# Patient Record
Sex: MALE | Race: WHITE | NOT HISPANIC OR LATINO
[De-identification: names, ages, dates, MRNs, and addresses within clinical notes are randomized per-mention and may not be internally consistent; named-entity substitution may affect disease eponyms.]

---

## 2019-01-23 ENCOUNTER — TRANSCRIPTION ENCOUNTER (OUTPATIENT)
Age: 49
End: 2019-01-23

## 2021-10-13 ENCOUNTER — RESULT REVIEW (OUTPATIENT)
Age: 51
End: 2021-10-13

## 2022-09-06 PROBLEM — Z00.00 ENCOUNTER FOR PREVENTIVE HEALTH EXAMINATION: Status: ACTIVE | Noted: 2022-09-06

## 2022-09-22 ENCOUNTER — APPOINTMENT (OUTPATIENT)
Dept: ORTHOPEDIC SURGERY | Facility: CLINIC | Age: 52
End: 2022-09-22

## 2022-09-22 VITALS — BODY MASS INDEX: 23.43 KG/M2 | HEIGHT: 72 IN | WEIGHT: 173 LBS

## 2022-09-22 DIAGNOSIS — M25.532 PAIN IN LEFT WRIST: ICD-10-CM

## 2022-09-22 DIAGNOSIS — Z78.9 OTHER SPECIFIED HEALTH STATUS: ICD-10-CM

## 2022-09-22 PROCEDURE — 20605 DRAIN/INJ JOINT/BURSA W/O US: CPT

## 2022-09-22 PROCEDURE — 99203 OFFICE O/P NEW LOW 30 MIN: CPT | Mod: 25

## 2022-09-22 PROCEDURE — 99213 OFFICE O/P EST LOW 20 MIN: CPT | Mod: 25

## 2022-09-22 PROCEDURE — 73110 X-RAY EXAM OF WRIST: CPT | Mod: LT

## 2022-09-22 NOTE — PROCEDURE
[Medium Joint Injection] : medium joint injection [Left] : of the left [Wrist] : wrist [Pain] : pain [Inflammation] : inflammation [Alcohol] : alcohol [Ethyl Chloride sprayed topically] : ethyl chloride sprayed topically [Sterile technique used] : sterile technique used [___ cc    1%] : Lidocaine ~Vcc of 1%  [___ cc    10mg] : Triamcinolone (Kenalog) ~Vcc of 10 mg

## 2022-09-22 NOTE — PHYSICAL EXAM
[Dorsal Wrist] : dorsal wrist [UJ] : UJ [Left] : left wrist [] : negative Alvarez's [FreeTextEntry9] : flexion 70/extension 75, pain with forced extension  [de-identified] : mild positive  [FreeTextEntry8] : signs of old scaphoid fx with styloid degenerative changes

## 2022-09-22 NOTE — HISTORY OF PRESENT ILLNESS
[Gradual] : gradual [7] : 7 [0] : 0 [Localized] : localized [Sharp] : sharp [Constant] : constant [Nothing helps with pain getting better] : Nothing helps with pain getting better [Full time] : Work status: full time [de-identified] : 52 year old male with LEFT wrist pain for 4-6 weeks. No injury/trauma. Pain with wrist flexion.  [] : no [FreeTextEntry3] : about a  month ago [FreeTextEntry5] : Patient states no known cause of injury [de-identified] : movements

## 2022-10-27 ENCOUNTER — APPOINTMENT (OUTPATIENT)
Dept: ORTHOPEDIC SURGERY | Facility: CLINIC | Age: 52
End: 2022-10-27
Payer: COMMERCIAL

## 2022-10-27 VITALS — WEIGHT: 173 LBS | BODY MASS INDEX: 23.43 KG/M2 | HEIGHT: 72 IN

## 2022-10-27 DIAGNOSIS — Z87.891 PERSONAL HISTORY OF NICOTINE DEPENDENCE: ICD-10-CM

## 2022-10-27 DIAGNOSIS — Z78.9 OTHER SPECIFIED HEALTH STATUS: ICD-10-CM

## 2022-10-27 PROCEDURE — 99212 OFFICE O/P EST SF 10 MIN: CPT

## 2022-10-31 NOTE — HISTORY OF PRESENT ILLNESS
[de-identified] : 52 year old male followed for Synovitis of the wrist. 5 weeks s/p CSI. He states he has significant pain after the CSI. Has returned with a MRI. Report not available. HE is improved at this time.  [FreeTextEntry1] : left wrists  [de-identified] : MRI micheline garcia

## 2022-12-08 ENCOUNTER — APPOINTMENT (OUTPATIENT)
Dept: ORTHOPEDIC SURGERY | Facility: CLINIC | Age: 52
End: 2022-12-08

## 2023-03-09 ENCOUNTER — APPOINTMENT (OUTPATIENT)
Dept: ORTHOPEDIC SURGERY | Facility: CLINIC | Age: 53
End: 2023-03-09
Payer: COMMERCIAL

## 2023-03-09 VITALS — WEIGHT: 173 LBS | HEIGHT: 72 IN | BODY MASS INDEX: 23.43 KG/M2

## 2023-03-09 DIAGNOSIS — M65.9 SYNOVITIS AND TENOSYNOVITIS, UNSPECIFIED: ICD-10-CM

## 2023-03-09 PROCEDURE — 20605 DRAIN/INJ JOINT/BURSA W/O US: CPT

## 2023-03-09 PROCEDURE — 99213 OFFICE O/P EST LOW 20 MIN: CPT | Mod: 25

## 2023-03-09 NOTE — DISCUSSION/SUMMARY
[de-identified] : Discussed the nature of the diagnosis and risk and benefits of different modalities of treatment.\par Discussed a CSI.\par Pt would like another CSI. Wrist proper. \par Done today and tolerated well. \par

## 2023-03-09 NOTE — HISTORY OF PRESENT ILLNESS
[7] : 7 [3] : 3 [Full time] : Work status: full time [de-identified] : 52 year old male followed for LT wrist synovitis. he has a CSI in September after which he was improved. He is having a reoccurrence of his pain with playing tennis. He has tried ice and heat. He has pain with impact.  [] : no [FreeTextEntry1] : Left wrists  [de-identified] : Ice/ Heat

## 2023-03-09 NOTE — PROCEDURE
[Medium Joint Injection] : medium joint injection [Left] : of the left [Wrist] : wrist [Pain] : pain [Inflammation] : inflammation [Alcohol] : alcohol [Ethyl Chloride sprayed topically] : ethyl chloride sprayed topically [Sterile technique used] : sterile technique used [___ cc    1%] : Lidocaine ~Vcc of 1%  [___ cc    10mg] : Triamcinolone (Kenalog) ~Vcc of 10 mg  [Risks, benefits, alternatives discussed / Verbal consent obtained] : the risks benefits, and alternatives have been discussed, and verbal consent was obtained

## 2023-03-22 ENCOUNTER — APPOINTMENT (OUTPATIENT)
Dept: ORTHOPEDIC SURGERY | Facility: CLINIC | Age: 53
End: 2023-03-22
Payer: COMMERCIAL

## 2023-03-22 VITALS — BODY MASS INDEX: 23.43 KG/M2 | WEIGHT: 173 LBS | HEIGHT: 72 IN

## 2023-03-22 PROCEDURE — 99214 OFFICE O/P EST MOD 30 MIN: CPT

## 2023-03-22 PROCEDURE — 73030 X-RAY EXAM OF SHOULDER: CPT | Mod: 50

## 2023-03-22 NOTE — DATA REVIEWED
[MRI] : MRI [Right] : of the right [Shoulder] : shoulder [Report was reviewed and noted in the chart] : The report was reviewed and noted in the chart [I reviewed the films/CD and agree] : I reviewed the films/CD and agree [FreeTextEntry1] : Severe AC OA and slap tear

## 2023-03-22 NOTE — DISCUSSION/SUMMARY
[de-identified] : 52 year old male presenting with b/l shoulder pain. \par MRI of the right shoulder demonstrates severe AC OA and slap tear.\par At this point we are recommending an MRI arthrogram to evaluate for recurrent right rotator cuff tear.\par If no full-thickness tear we will consider injection to the long head of the biceps tendon after reviewing the MRI.\par If there is a full-thickness tear, would consider revision rotator cuff repair and mini open biceps tenodesis.\par \par For the left shoulder, patient has a symptomatic AC joint\par Patient will use voltearan gel.\par Consider AC joint injection if it persist.\par \par Based on the patient’s history and physical exam findings, I am concerned about the possibility of a full-thickness rotator cuff tear.  The patient has pain and subjective weakness consistent with this diagnosis.  Therefore, I recommend an MRI to evaluate for a rotator cuff tear.  This will also aid in evaluating for injury to the biceps labral complex and for any signs of impingement. The patient will follow-up after MRI to discuss further treatment options. \par Since patient has prior history of rotator cuff repair more than a year ago we did an arthrogram to evaluate for recurrent full-thickness tear.\par \par (1) We discussed a comprehensive treatment plans that included a prescription management plan involving the use of prescription strength medications to include Ibuprofen 600-800 mg TID, versus 500-650 mg Tylenol. We also discussed prescribing topical diclofenac (Voltaren gel) as well as once daily Meloxicam 15 mg. \par \par (2) The patient has More Than One chronic injuries/illnesses as outlined, discussed, and documented by ICD 10 codes listed, as well as the HPI and Plan section. \par \par There is a moderate risk of morbidity with further treatment, especially from use of prescription strength medications and possible side effects of these medications which include upset stomach and cardiac/renal issues with long term use were discussed. \par \par (3) I recommended that the patient follow-up with their medical physician to discuss any significant specific potential issues with long term use such as interactions with current medications or with exacerbation of underlying medical morbidities.\par \par Attestation:\par \par I, Giovany Green, attest that this documentation has been prepared under the direction and in the presence of Provider Mic Christian MD.\par \par The documentation recorded by the scribe, in my presence, accurately reflects the service I personally performed, and the decisions made by me with my edits as appropriate.\par \par Mic Christian MD\par

## 2023-03-30 ENCOUNTER — RESULT REVIEW (OUTPATIENT)
Age: 53
End: 2023-03-30

## 2023-04-05 ENCOUNTER — APPOINTMENT (OUTPATIENT)
Dept: ORTHOPEDIC SURGERY | Facility: CLINIC | Age: 53
End: 2023-04-05
Payer: COMMERCIAL

## 2023-04-05 DIAGNOSIS — M25.512 PAIN IN LEFT SHOULDER: ICD-10-CM

## 2023-04-05 DIAGNOSIS — M75.21 BICIPITAL TENDINITIS, RIGHT SHOULDER: ICD-10-CM

## 2023-04-05 DIAGNOSIS — S43.431A SUPERIOR GLENOID LABRUM LESION OF RIGHT SHOULDER, INITIAL ENCOUNTER: ICD-10-CM

## 2023-04-05 PROCEDURE — 99214 OFFICE O/P EST MOD 30 MIN: CPT

## 2023-04-05 NOTE — HISTORY OF PRESENT ILLNESS
[de-identified] : 3/22/23 52 yr old male presenting with b/l shoulder pain\par \par 4/5/23 Patient returns to office for his right shoulder. He presents for MRI arthrogram review. He states he has been making lifestyle changes and changing his gym routine to compensate for the pain. At this time, pain is controlled. He is currently using Voltaren gel with good relief. He is LHD.

## 2023-04-05 NOTE — PHYSICAL EXAM
[de-identified] : Constitutional: The general appearance of the patient is well developed, well nourished, no deformities and well groomed. Normal\par \par Gait: Gait and function is as follows: normal gait.\par \par Skin: Head and neck visualized skin is normal. Left upper extremity visualized skin is normal. Right upper extremity visualized skin is normal. Thoracic Skin of the thoracic spine shows visualized skin is normal.\par \par Cardiovascular: palpable radial pulse bilaterally, good capillary refill in digits of the bilateral upper extremities and no temperature or color changes in the bilateral upper extremities.\par \par Lymphatic: Normal Palpation of lymph nodes in the cervical.\par \par Neurologic: fine motor control in the bilateral upper extremities is intact. Deep Tendon Reflexes in Upper and Lower Extremities Negative Kim's in the bilateral upper extremities. The patient is oriented to time, place and person. Sensation to light touch intact in the bilateral upper extremities. Mood and Affect is normal.\par \par Right Shoulder: Inspection of the shoulder/upper arm is as follows: no scapula winging, no biceps deformity and no AC joint deformity. Palpation of the shoulder/upper arm is as follows: There is tenderness at the proximal biceps tendon. Range of motion of the shoulder is as follows: Pain with internal rotation, external rotation, abduction and forward flexion. Strength of the shoulder is as follows: Supraspinatus 4/5. External Rotation 4/5. Internal Rotation 4/5. Infraspinatus 5/5 4/5. Deltoid 5/5 Ligament Stability and Special Tests of the shoulder is as follows: Neer test is positive. Vega' test is positive.\par \par Left Shoulder: Inspection of the shoulder/upper arm is as follows: no scapula winging, no biceps deformity and no AC joint deformity. Palpation of the shoulder/upper arm is as follows: There is tenderness at the proximal biceps tendon. Range of motion of the shoulder is as follows: Pain with internal rotation, external rotation, abduction and forward flexion. Strength of the shoulder is as follows: Supraspinatus 4/5. External Rotation 4/5. Internal Rotation 4/5. Infraspinatus 5/5 4/5. Deltoid 5/5 Ligament Stability and Special Tests of the shoulder is as follows: Neer test is positive. Vega' test is positive.\par \par Neck:\par \par Inspection / Palpation of the cervical spine is as follows: mild paracervical tenderness. Range of motion of the cervical spine is as follows: moderately decreased range of motion of the cervical spine. Stability testing for the cervical spine is as follows Stable range of motion.\par \par Back, including spine: Inspection / Palpation of the thoracic/lumbar spine is as follows: There is a full, pain free, stable range of motion of the thoracic spine with a normal tone and not tenderness to palpation..

## 2023-04-05 NOTE — DATA REVIEWED
[MRI] : MRI [Right] : of the right [Shoulder] : shoulder [Report was reviewed and noted in the chart] : The report was reviewed and noted in the chart [I independently reviewed and interpreted images and report] : I independently reviewed and interpreted images and report [I reviewed the films/CD and additionally noted] : I reviewed the films/CD and additionally noted [FreeTextEntry1] : MRA Zwp: Postoperative appearance of the supraspinatus tendon, without evidence for\par high-grade re-tear.\par \par Nondisplaced tear of the posterior/posterosuperior glenoid labrum partially\par extending into the anchor of the long head of the biceps tendon.

## 2023-04-05 NOTE — DISCUSSION/SUMMARY
[de-identified] : 52 year old male presenting with b/l shoulder pain, right is worse than the left. \par \par Regarding the right shoulder: \par MRA right shoulder revealing  SLAP tear extending into the anchor, there is no evidence recurrent  RCT. \par He will continue use of Voltaren gel, consider biceps injection vs GH if pain persists \par \par Regarding the left shoulder: \par Patient has a symptomatic AC joint, improving with Voltaren gel \par Consider MRI arthrogram in the future vs injection \par \par \par (1) We discussed a comprehensive treatment plans that included a prescription management plan involving the use of prescription strength medications to include Ibuprofen 600-800 mg TID, versus 500-650 mg Tylenol. We also discussed prescribing topical diclofenac (Voltaren gel) as well as once daily Meloxicam 15 mg. \par \par (2) The patient has More Than One chronic injuries/illnesses as outlined, discussed, and documented by ICD 10 codes listed, as well as the HPI and Plan section. \par \par There is a moderate risk of morbidity with further treatment, especially from use of prescription strength medications and possible side effects of these medications which include upset stomach and cardiac/renal issues with long term use were discussed. \par \par (3) I recommended that the patient follow-up with their medical physician to discuss any significant specific potential issues with long term use such as interactions with current medications or with exacerbation of underlying medical morbidities.\par \par Attestation:\par \par I, Shanthi Covington, attest that this documentation has been prepared under the direction and in the presence of Provider Mic Christian MD.\par \par The documentation recorded by the scribe, in my presence, accurately reflects the service I personally performed, and the decisions made by me with my edits as appropriate.\par \par Mic Christian MD\par

## 2024-04-10 ENCOUNTER — APPOINTMENT (OUTPATIENT)
Dept: ORTHOPEDIC SURGERY | Facility: CLINIC | Age: 54
End: 2024-04-10
Payer: COMMERCIAL

## 2024-04-10 PROCEDURE — 73564 X-RAY EXAM KNEE 4 OR MORE: CPT | Mod: LT

## 2024-04-10 PROCEDURE — 99214 OFFICE O/P EST MOD 30 MIN: CPT

## 2024-04-10 NOTE — HISTORY OF PRESENT ILLNESS
[de-identified] : The patient is a 53 year old [LEFT] hand dominant male who presents today complaining of LT knee.   Date of Injury/Onset: 2-3 weeks ago  Pain:    At Rest: 0/10  With Activity:  0/10  Mechanism of injury: NKI  This is NOT a Work Related Injury being treated under Worker's Compensation. This is NOT an athletic injury occurring associated with an interscholastic or organized sports team. Quality of symptoms: swelling Improves with:  Worse with: activity  Prior treatment: n/a Prior Imaging: n/a  Out of work/sport: _, since _ School/Sport/Position/Occupation:  Additional Information: None

## 2024-04-10 NOTE — DISCUSSION/SUMMARY
[de-identified] : Reviewed all X Ray images with patient today and interpretation was provided. MRI with and without contrast of left knee to eval palpable mass.  Follow up after MRI scan.     ----------------------------------------------- Home Exercise The patient is instructed on a home exercise program.   BROOKS CANSECO Acting as a Scribe for Dr. Kyra LITTLE, Brooks Canseco, attest that this documentation has been prepared under the direction and in the presence of Provider Dr. Rae.   Activity Modification The patient was advised to modify their activities.   Dx / Natural History The patient was advised of the diagnosis.  The natural history of the pathology was explained in full to the patient in layman's terms.  Several different treatment options were discussed and explained in full to the patient including the risks and benefits of both surgical and non-surgical treatments.  All questions and concerns were answered.   Pain Guide Activities The patient was advised to let pain guide the gradual advancement of activities.   RICE I explained to the patient that rest, ice, compression, and elevation would benefit them.  They may return to activity after follow-up or when they no longer have any pain.   The patient's current medication management of their orthopedic diagnosis was reviewed today: (1) We discussed a comprehensive treatment plan that included possible pharmaceutical management involving the use of prescription strength medications including but not limited to options such as oral Naprosyn 500mg BID, once daily Meloxicam 15 mg, or 500-650 mg Tylenol versus over the counter oral medications and topical prescription NSAID Pennsaid vs over the counter Voltaren gel. (2) There is a moderate risk of morbidity with further treatment, especially from use of prescription strength medications and possible side effects of these medications which include upset stomach with oral medications, skin reactions to topical medications and cardiac/renal issues with long term use. (3) I recommended that the patient follow-up with their medical physician to discuss any significant specific potential issues with long term medication use such as interactions with current medications or with exacerbation of underlying medical comorbidities. (4) The benefits and risks associated with use of injectable, oral or topical, prescription and over the counter anti-inflammatory medications were discussed with the patient. The patient voiced understanding of the risks including but not limited to bleeding, stroke, kidney dysfunction, heart disease, and were referred to the black box warning label for further information.

## 2024-04-10 NOTE — ADDENDUM
[FreeTextEntry1] :  Documented by Demarcus Canseco acting as a scribe for Dr. Rae and Ivan Martinez PA-C on 04/10/2024 and was presence for the following sections: Physical Exam; Data Reviewed; Assessment; Discussion/Summary. All medical record entries made by the Scribe were at my, Dr. Rae, and Ivan Martinez, direction and personally dictated by me on 04/10/2024. I have reviewed the chart and agree that the record accurately reflects my personal performance of the history, physical exam, procedure and imaging.

## 2024-04-10 NOTE — DATA REVIEWED
[FreeTextEntry1] : 4/10/24 OC X RAY Left Knee: 4 view: This scan was reviewed and interpreted by Dr. Rae, and his findings are-  patella yu, PFOA

## 2024-04-10 NOTE — PHYSICAL EXAM
[de-identified] : Neurovascularly intact distally   Left Knee:  Full ROM Ligamental stable  Nontender bursa tenderness Possible palpable ganglion cyst  no effusion

## 2024-04-14 ENCOUNTER — APPOINTMENT (OUTPATIENT)
Dept: MRI IMAGING | Facility: CLINIC | Age: 54
End: 2024-04-14

## 2024-04-15 ENCOUNTER — APPOINTMENT (OUTPATIENT)
Dept: MRI IMAGING | Facility: CLINIC | Age: 54
End: 2024-04-15
Payer: COMMERCIAL

## 2024-04-15 PROCEDURE — 73723 MRI JOINT LWR EXTR W/O&W/DYE: CPT | Mod: LT

## 2024-04-17 ENCOUNTER — APPOINTMENT (OUTPATIENT)
Dept: ORTHOPEDIC SURGERY | Facility: CLINIC | Age: 54
End: 2024-04-17
Payer: COMMERCIAL

## 2024-04-17 DIAGNOSIS — M25.562 PAIN IN LEFT KNEE: ICD-10-CM

## 2024-04-17 DIAGNOSIS — S89.92XD UNSPECIFIED INJURY OF LEFT LOWER LEG, SUBSEQUENT ENCOUNTER: ICD-10-CM

## 2024-04-17 DIAGNOSIS — M25.511 PAIN IN RIGHT SHOULDER: ICD-10-CM

## 2024-04-17 DIAGNOSIS — Z78.9 OTHER SPECIFIED HEALTH STATUS: ICD-10-CM

## 2024-04-17 DIAGNOSIS — Z98.890 OTHER SPECIFIED POSTPROCEDURAL STATES: ICD-10-CM

## 2024-04-17 DIAGNOSIS — M79.18 MYALGIA, OTHER SITE: ICD-10-CM

## 2024-04-17 PROCEDURE — 99214 OFFICE O/P EST MOD 30 MIN: CPT

## 2024-04-17 NOTE — DISCUSSION/SUMMARY
[de-identified] : Reviewed all MRI images with patient today and interpretation was provided.  cleavage tear LMT body with parameniscal cyst Patient states that they are not experiencing any pain.  Patient will follow up as needed.  *treat his daughter as well   ----------------------------------------------- Home Exercise The patient is instructed on a home exercise program.   BROOKS CANSECO Acting as a Scribe for Dr. Kyra LITTLE, Brooks Canseco, attest that this documentation has been prepared under the direction and in the presence of Provider Dr. Rae.   Activity Modification The patient was advised to modify their activities.   Dx / Natural History The patient was advised of the diagnosis.  The natural history of the pathology was explained in full to the patient in layman's terms.  Several different treatment options were discussed and explained in full to the patient including the risks and benefits of both surgical and non-surgical treatments.  All questions and concerns were answered.   Pain Guide Activities The patient was advised to let pain guide the gradual advancement of activities.   AIME LITTLE explained to the patient that rest, ice, compression, and elevation would benefit them.  They may return to activity after follow-up or when they no longer have any pain.   The patient's current medication management of their orthopedic diagnosis was reviewed today: (1) We discussed a comprehensive treatment plan that included possible pharmaceutical management involving the use of prescription strength medications including but not limited to options such as oral Naprosyn 500mg BID, once daily Meloxicam 15 mg, or 500-650 mg Tylenol versus over the counter oral medications and topical prescription NSAID Pennsaid vs over the counter Voltaren gel. (2) There is a moderate risk of morbidity with further treatment, especially from use of prescription strength medications and possible side effects of these medications which include upset stomach with oral medications, skin reactions to topical medications and cardiac/renal issues with long term use. (3) I recommended that the patient follow-up with their medical physician to discuss any significant specific potential issues with long term medication use such as interactions with current medications or with exacerbation of underlying medical comorbidities. (4) The benefits and risks associated with use of injectable, oral or topical, prescription and over the counter anti-inflammatory medications were discussed with the patient. The patient voiced understanding of the risks including but not limited to bleeding, stroke, kidney dysfunction, heart disease, and were referred to the black box warning label for further information.

## 2024-04-17 NOTE — DATA REVIEWED
[FreeTextEntry1] : 4/10/24 OC X RAY Left Knee: 4 view: This scan was reviewed and interpreted by Dr. Rae, and his findings are-  patella yu, PFOA   4/15/24 OC MRI LEFT KNEE WITH AN WITHOUT CONTRAST  This scan was reviewed and interpreted by Dr. Rae, and his findings are-  Impression: 1. Findings raising suspicion of recurrent lateral meniscal tearing, parameniscal cyst surrounding the anterior horn and body of the lateral meniscus as well as moderate infrapatellar synovitis and moderate anterior lateral soft tissue swelling with diffuse patchy enhancement of this area on post-contrast imaging most likely related to diffuse chronic inflammatory change and possible partial parameniscal cyst rupture or leakage without well-defined enhancing mass lesion. There is chronic appearing degenerative change in the lateral compartment including chondral loss, joint space narrowing, osteophytes and mild subchondral edema and cysts as well as findings suggesting partial lateral meniscectomy which should be correlated clinically. 2. Complete chronic ACL tear, mild chronic PCL, MCL and fibular collateral ligament sprains with mild subcortical cysts at the proximal attachment of the medial collateral ligament, moderate extensor mechanism tendinopathy and mild patellofemoral effusion and synovitis without evidence of acute fracture. 3. Mild enhancement of suspected degenerative or inflammatory cyst in the superior peripheral medial femoral condyle. 4. Clinical follow up is needed.

## 2024-04-17 NOTE — HISTORY OF PRESENT ILLNESS
[de-identified] : The patient is a 53 year old [LEFT] hand dominant male who presents today complaining of LT knee.   Date of Injury/Onset: 2-3 weeks ago  Pain:    At Rest: 0/10  With Activity:  0/10  Mechanism of injury: NKI  This is NOT a Work Related Injury being treated under Worker's Compensation. This is NOT an athletic injury occurring associated with an interscholastic or organized sports team. Quality of symptoms: swelling Improves with:  Worse with: activity  Prior treatment/ prior imaging since last visit: MRI @ [facility]  Out of work/sport: _, since _ School/Sport/Position/Occupation:  Additional Information: None

## 2024-04-17 NOTE — ADDENDUM
[FreeTextEntry1] :  Documented by Demarcus Canseco acting as a scribe for Dr. Rae and Ivan Martinez PA-C on 04/17/2024 and was presence for the following sections: Physical Exam; Data Reviewed; Assessment; Discussion/Summary. All medical record entries made by the Scribe were at my, Dr. Rae, and Ivan Martinez, direction and personally dictated by me on 04/17/2024. I have reviewed the chart and agree that the record accurately reflects my personal performance of the history, physical exam, procedure and imaging.

## 2024-04-17 NOTE — PHYSICAL EXAM
[de-identified] : Neurovascularly intact distally   Left Knee:  Full ROM Ligamental stable  Nontender bursa tenderness Possible palpable ganglion cyst  no effusion

## 2024-08-27 ENCOUNTER — APPOINTMENT (OUTPATIENT)
Dept: ORTHOPEDIC SURGERY | Facility: CLINIC | Age: 54
End: 2024-08-27
Payer: COMMERCIAL

## 2024-08-27 VITALS — BODY MASS INDEX: 23.43 KG/M2 | WEIGHT: 173 LBS | HEIGHT: 72 IN

## 2024-08-27 DIAGNOSIS — M79.18 MYALGIA, OTHER SITE: ICD-10-CM

## 2024-08-27 DIAGNOSIS — M25.522 PAIN IN LEFT ELBOW: ICD-10-CM

## 2024-08-27 DIAGNOSIS — S59.902A UNSPECIFIED INJURY OF LEFT ELBOW, INITIAL ENCOUNTER: ICD-10-CM

## 2024-08-27 PROCEDURE — 20611 DRAIN/INJ JOINT/BURSA W/US: CPT

## 2024-08-27 PROCEDURE — 73080 X-RAY EXAM OF ELBOW: CPT | Mod: LT

## 2024-08-27 PROCEDURE — 99214 OFFICE O/P EST MOD 30 MIN: CPT | Mod: 25

## 2024-08-27 NOTE — PHYSICAL EXAM
[de-identified] : Neurovascularly intact distally  Left Elbow: Tenderness over lateral epicondyle. ROM pain with pronation. pain with supination. elbow pain with resisted wrist extension. elbow pain with resisted forearm supination.

## 2024-08-27 NOTE — DATA REVIEWED
[FreeTextEntry1] : 4/10/24 OC X RAY Left Knee: 4 view: This scan was reviewed and interpreted by Dr. Rae, and his findings are-  patella yu, PFOA   4/15/24 OC MRI LEFT KNEE WITH AN WITHOUT CONTRAST  This scan was reviewed and interpreted by Dr. Rae, and his findings are-  Impression: 1. Findings raising suspicion of recurrent lateral meniscal tearing, parameniscal cyst surrounding the anterior horn and body of the lateral meniscus as well as moderate infrapatellar synovitis and moderate anterior lateral soft tissue swelling with diffuse patchy enhancement of this area on post-contrast imaging most likely related to diffuse chronic inflammatory change and possible partial parameniscal cyst rupture or leakage without well-defined enhancing mass lesion. There is chronic appearing degenerative change in the lateral compartment including chondral loss, joint space narrowing, osteophytes and mild subchondral edema and cysts as well as findings suggesting partial lateral meniscectomy which should be correlated clinically. 2. Complete chronic ACL tear, mild chronic PCL, MCL and fibular collateral ligament sprains with mild subcortical cysts at the proximal attachment of the medial collateral ligament, moderate extensor mechanism tendinopathy and mild patellofemoral effusion and synovitis without evidence of acute fracture. 3. Mild enhancement of suspected degenerative or inflammatory cyst in the superior peripheral medial femoral condyle. 4. Clinical follow up is needed.  8/27/24 OC X-RAY Elbow 2 views: This scan was reviewed and interpreted by Dr. Rae, and his findings are- large olecranon bone spur

## 2024-08-27 NOTE — ADDENDUM
[FreeTextEntry1] : Documented by Montana Bass acting as a scribe for Dr. Rae and Ivan Martinez PA-C on 08/27/2024 and was presence for the following sections: Physical Exam; Data Reviewed; Assessment; Discussion/Summary. All medical record entries made by the Scribe were at my, Dr. Rae, and Ivan Martinez, direction and personally dictated by me on 08/27/2024. I have reviewed the chart and agree that the record accurately reflects my personal performance of the history, physical exam, procedure and imaging.

## 2024-08-27 NOTE — DISCUSSION/SUMMARY
[de-identified] : Reviewed all X Ray images with patient today and interpretation was provided. Discussed treatment options, the patient would like to follow through with left elbow CSI injections. RB&A to corticosteroid injection discussed. All questions were answered. Patient wishes to move forward with injection today. Discussed NSAIDs and home exercise. Patient will follow up as needed.       ULTRASOUND GUIDED LEFT ELBOW CORTICOSTEROID INJECTION Cortisone shot into the Lateral Epicondyle is recommended because of the severity of symptom. The risks, benefits, and alternatives to cortisone injection were explained in full to the patient. Risks outlined include but are not limited to infection, sepsis, bleeding, scarring, skin discoloration, temporary increase in pain, syncopal episode, failure to resolve symptoms, allergic reaction, symptom recurrence, and elevation of blood sugar in diabetics. Patient understood the risks. All questions were answered. After discussion of options, patient requested an injection. Oral informed consent was obtained, and sterile prep was done of the injection site. Oral informed consent was obtained, and sterile prep was done of the injection site. A mixture of 20mg of Kenalog, 1/2 cc of 1% Lidocaine was sterilely prepared by me. Sterile technique was used to introduce the mixture into the lateral epicondyle. Patient tolerated the procedure well. Advised to ice the injection site this evening.        ----------------------------------------------- Home Exercise The patient is instructed on a home exercise program.  LEONILA KHOURY Acting as a Scribe for Dr. Kyra LITTLE, Leonila Khoury, attest that this documentation has been prepared under the direction and in the presence of Provider Dr. Rae.  Activity Modification The patient was advised to modify their activities.  Dx / Natural History The patient was advised of the diagnosis. The natural history of the pathology was explained in full to the patient in layman's terms. Several different treatment options were discussed and explained in full to the patient including the risks and benefits of both surgical and non-surgical treatments.  All questions and concerns were answered.  Pain Guide Activities The patient was advised to let pain guide the gradual advancement of activities.  AIME LITTLE explained to the patient that rest, ice, compression, and elevation would benefit them. They may return to activity after follow-up or when they no longer have any pain.  The patient's current medication management of their orthopedic diagnosis was reviewed today: (1) We discussed a comprehensive treatment plan that included possible pharmaceutical management involving the use of prescription strength medications including but not limited to options such as oral Naprosyn 500mg BID, once daily Meloxicam 15 mg, or 500-650 mg Tylenol versus over the counter oral medications and topical prescription NSAID Pennsaid vs over the counter Voltaren gel. (2) There is a moderate risk of morbidity with further treatment, especially from use of prescription strength medications and possible side effects of these medications which include upset stomach with oral medications, skin reactions to topical medications and cardiac/renal issues with long term use. (3) I recommended that the patient follow-up with their medical physician to discuss any significant specific potential issues with long term medication use such as interactions with current medications or with exacerbation of underlying medical comorbidities. (4) The benefits and risks associated with use of injectable, oral or topical, prescription and over the counter anti-inflammatory medications were discussed with the patient. The patient voiced understanding of the risks including but not limited to bleeding, stroke, kidney dysfunction, heart disease, and were referred to the black box warning label for further information.

## 2024-08-27 NOTE — HISTORY OF PRESENT ILLNESS
[de-identified] : The patient is a 54 year old [LEFT] hand dominant male who presents today complaining of LT elbow.   Date of Injury/Onset: 2 weeks ago  Pain:    At Rest: 4/10  With Activity:  7-8/10  Mechanism of injury: Pt is a  This is NOT a Work Related Injury being treated under Worker's Compensation. This is NOT an athletic injury occurring associated with an interscholastic or organized sports team. Quality of symptoms: sharp, aching, throbbing  Improves with: NSAIDS  Worse with: using, playing tennis, hitting  Prior treatment: n/a  Prior Imaging: n/a Out of work/sport: _, since _ School/Sport/Position/Occupation: business manage Additional Information: None

## 2025-08-06 ENCOUNTER — APPOINTMENT (OUTPATIENT)
Dept: ORTHOPEDIC SURGERY | Facility: CLINIC | Age: 55
End: 2025-08-06
Payer: COMMERCIAL

## 2025-08-06 VITALS — WEIGHT: 173 LBS | BODY MASS INDEX: 23.43 KG/M2 | HEIGHT: 72 IN

## 2025-08-06 DIAGNOSIS — M25.512 PAIN IN LEFT SHOULDER: ICD-10-CM

## 2025-08-06 DIAGNOSIS — M24.812 OTHER SPECIFIC JOINT DERANGEMENTS OF LEFT SHOULDER, NOT ELSEWHERE CLASSIFIED: ICD-10-CM

## 2025-08-06 DIAGNOSIS — M79.18 MYALGIA, OTHER SITE: ICD-10-CM

## 2025-08-06 DIAGNOSIS — S49.92XA UNSPECIFIED INJURY OF LEFT SHOULDER AND UPPER ARM, INITIAL ENCOUNTER: ICD-10-CM

## 2025-08-06 PROCEDURE — 73030 X-RAY EXAM OF SHOULDER: CPT | Mod: LT

## 2025-08-06 PROCEDURE — 99214 OFFICE O/P EST MOD 30 MIN: CPT | Mod: 25

## 2025-08-14 ENCOUNTER — RESULT REVIEW (OUTPATIENT)
Age: 55
End: 2025-08-14

## 2025-09-03 ENCOUNTER — APPOINTMENT (OUTPATIENT)
Dept: ORTHOPEDIC SURGERY | Facility: CLINIC | Age: 55
End: 2025-09-03
Payer: COMMERCIAL

## 2025-09-03 VITALS — BODY MASS INDEX: 23.43 KG/M2 | WEIGHT: 173 LBS | HEIGHT: 72 IN

## 2025-09-03 DIAGNOSIS — M25.512 PAIN IN LEFT SHOULDER: ICD-10-CM

## 2025-09-03 DIAGNOSIS — M24.812 OTHER SPECIFIC JOINT DERANGEMENTS OF LEFT SHOULDER, NOT ELSEWHERE CLASSIFIED: ICD-10-CM

## 2025-09-03 DIAGNOSIS — M79.18 MYALGIA, OTHER SITE: ICD-10-CM

## 2025-09-03 DIAGNOSIS — S49.92XA UNSPECIFIED INJURY OF LEFT SHOULDER AND UPPER ARM, INITIAL ENCOUNTER: ICD-10-CM

## 2025-09-03 PROCEDURE — 99214 OFFICE O/P EST MOD 30 MIN: CPT

## 2025-09-03 RX ORDER — CELECOXIB 200 MG/1
200 CAPSULE ORAL
Qty: 60 | Refills: 2 | Status: ACTIVE | COMMUNITY
Start: 2025-09-03 | End: 1900-01-01